# Patient Record
Sex: MALE | Race: WHITE | Employment: OTHER | URBAN - METROPOLITAN AREA
[De-identification: names, ages, dates, MRNs, and addresses within clinical notes are randomized per-mention and may not be internally consistent; named-entity substitution may affect disease eponyms.]

---

## 2018-01-10 ENCOUNTER — APPOINTMENT (OUTPATIENT)
Dept: GENERAL RADIOLOGY | Age: 83
End: 2018-01-10
Payer: MEDICARE

## 2018-01-10 ENCOUNTER — HOSPITAL ENCOUNTER (EMERGENCY)
Age: 83
Discharge: HOME | End: 2018-01-10
Attending: EMERGENCY MEDICINE
Payer: MEDICARE

## 2018-01-10 ENCOUNTER — APPOINTMENT (OUTPATIENT)
Dept: CT IMAGING | Age: 83
End: 2018-01-10
Payer: MEDICARE

## 2018-01-10 VITALS
DIASTOLIC BLOOD PRESSURE: 88 MMHG | TEMPERATURE: 96.3 F | RESPIRATION RATE: 18 BRPM | OXYGEN SATURATION: 98 % | SYSTOLIC BLOOD PRESSURE: 141 MMHG | HEART RATE: 104 BPM | WEIGHT: 130 LBS | BODY MASS INDEX: 20.4 KG/M2 | HEIGHT: 67 IN

## 2018-01-10 DIAGNOSIS — S52.592A OTHER CLOSED FRACTURE OF DISTAL END OF LEFT RADIUS, INITIAL ENCOUNTER: Primary | ICD-10-CM
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
## 2018-01-10 PROCEDURE — 70450 CT HEAD/BRAIN W/O DYE: CPT

## 2018-01-10 PROCEDURE — 29125 APPL SHORT ARM SPLINT STATIC: CPT

## 2018-01-10 PROCEDURE — 73080 X-RAY EXAM OF ELBOW: CPT

## 2018-01-10 PROCEDURE — 99284 EMERGENCY DEPT VISIT MOD MDM: CPT

## 2018-01-10 PROCEDURE — 73110 X-RAY EXAM OF WRIST: CPT

## 2018-01-10 ASSESSMENT — PAIN DESCRIPTION - LOCATION: LOCATION: ARM;SHOULDER

## 2018-01-10 ASSESSMENT — ENCOUNTER SYMPTOMS
NAUSEA: 0
SHORTNESS OF BREATH: 0
VOMITING: 0
TROUBLE SWALLOWING: 0
COUGH: 0

## 2018-01-10 ASSESSMENT — PAIN SCALES - GENERAL: PAINLEVEL_OUTOF10: 7

## 2018-01-10 ASSESSMENT — PAIN DESCRIPTION - ORIENTATION: ORIENTATION: LEFT

## 2018-01-10 NOTE — ED PROVIDER NOTES
[address]  eMERGENCY dEPARTMENT eNCOUnter      Pt Name: Lenny Stewart  MRN: [id number]  [place] 1935  Date of evaluation: 1/10/2018  Provider: Scot Lainez       Chief Complaint   Patient presents with   Arin Silence Fall    Arm Pain    Shoulder Pain         HISTORY OF PRESENT ILLNESS  (Location/Symptom, Timing/Onset, Context/Setting, Quality, Duration, Modifying Factors, Severity.)   Lenny Stewart is a 80 y.o. male who presents to the emergency department for evaluation of  orthopedic pain:    Location/Symptom:  Left arm injury  Timing/Onset:  yesterday  Context/Setting:  Patient presents to ED with complaints of left arm injury. States he tripped and fell yesterday. Patient hit his head but did not lose consciousness. Complaining of left elbow and left wrist pain. States it hurts worse to move it. Patient applied Ace wrap to left wrist but had to remove it because pain was too severe. Denies headache, dizziness, lightheadedness. Denies neck or back pain. Denies taking blood thinners. Right hand dominant. Quality:   Achy, sharp  Duration:   constant  Modifying Factors: Worse with movement, better with rest  Severity:   Mild-moderate      Nursing Notes were reviewed and I agree. REVIEW OF SYSTEMS    (2-9 systems for level 4, 10 or more for level 5)     Review of Systems   Constitutional: Negative for chills and fever. HENT: Negative for trouble swallowing. Respiratory: Negative for cough and shortness of breath. Cardiovascular: Negative for chest pain and palpitations. Gastrointestinal: Negative for nausea and vomiting. Musculoskeletal:        Left wrist pain; left elbow pain     Except as noted above the remainder of the review of systems was reviewed and negative. PAST MEDICAL HISTORY         Diagnosis Date    Arthritis     Asthma     COPD (chronic obstructive pulmonary disease) ([facility] [address]     Pneumonia      Reviewed.   SURGICAL HISTORY exhibits decreased range of motion, tenderness, bony tenderness and swelling. He exhibits no deformity. Cervical back: Normal.        Thoracic back: Normal.        Lumbar back: Normal.   Left wrist with radial and dorsal tenderness. Mild swelling. Radial pulse palpable, +2. Sensation intact, cap refill less than 2 seconds. Neurological: He is alert and oriented to person, place, and time. Coordination normal.   Skin: Skin is warm and dry. He is not diaphoretic. Psychiatric: He has a normal mood and affect. His behavior is normal.       DIAGNOSTIC RESULTS     RADIOLOGY:   [] Visualized by me  And No att. providers found   Xr Elbow Left (min 3 Views)    Result Date: 1/10/2018  EXAMINATION: 3 VIEWS OF THE LEFT ELBOW 1/10/2018 11:55 am COMPARISON: None. HISTORY: ORDERING SYSTEM PROVIDED HISTORY: fall TECHNOLOGIST PROVIDED HISTORY: Reason for exam:->fall Ordering Physician Provided Reason for Exam: Pt fell yesterday pt c/o pain/bruising left wrist/hand/elbow with decreased ROM. Acuity: Acute Type of Exam: Initial Mechanism of Injury: Pt fell yesterday pt c/o pain/bruising left wrist/hand/elbow with decreased ROM. FINDINGS: No evidence of acute fracture or dislocation. Bone mineralization and alignment appear intact. No elevation of the anterior fat pad to suggest significant joint effusion/ hemarthrosis. No radiopaque soft tissue foreign bodies are seen. No evidence of acute fracture. Xr Wrist Left (min 3 Views)    Result Date: 1/10/2018  EXAMINATION: 3 VIEWS OF THE LEFT WRIST 1/10/2018 11:55 am COMPARISON: None. HISTORY: ORDERING SYSTEM PROVIDED HISTORY: fall TECHNOLOGIST PROVIDED HISTORY: Reason for exam:->fall Ordering Physician Provided Reason for Exam: Pt fell yesterday pt c/o pain/bruising left wrist/hand/elbow with decreased ROM. Acuity: Acute Type of Exam: Initial Mechanism of Injury: Pt fell yesterday pt c/o pain/bruising left wrist/hand/elbow with decreased ROM.  FINDINGS: AP, lateral,

## 2018-01-11 ENCOUNTER — OFFICE VISIT (OUTPATIENT)
Dept: ORTHOPEDIC SURGERY | Age: 83
End: 2018-01-11
Payer: MEDICARE

## 2018-01-11 VITALS — BODY MASS INDEX: 20.4 KG/M2 | HEIGHT: 67 IN | WEIGHT: 130 LBS

## 2018-01-11 DIAGNOSIS — S52.532A CLOSED COLLES' FRACTURE OF LEFT RADIUS, INITIAL ENCOUNTER: Primary | ICD-10-CM

## 2018-01-11 PROCEDURE — 25600 CLTX DST RDL FX/EPHYS SEP WO: CPT | Performed by: ORTHOPAEDIC SURGERY

## 2018-01-11 PROCEDURE — 99024 POSTOP FOLLOW-UP VISIT: CPT | Performed by: ORTHOPAEDIC SURGERY

## 2018-01-25 ENCOUNTER — OFFICE VISIT (OUTPATIENT)
Dept: ORTHOPEDIC SURGERY | Age: 83
End: 2018-01-25

## 2018-01-25 DIAGNOSIS — S52.532D CLOSED COLLES' FRACTURE OF LEFT RADIUS WITH ROUTINE HEALING, SUBSEQUENT ENCOUNTER: Primary | ICD-10-CM

## 2018-01-25 PROBLEM — S52.532A FRACTURE, COLLES, LEFT, CLOSED: Status: ACTIVE | Noted: 2018-01-25

## 2018-01-25 PROCEDURE — 99024 POSTOP FOLLOW-UP VISIT: CPT | Performed by: ORTHOPAEDIC SURGERY

## 2018-02-06 ENCOUNTER — OFFICE VISIT (OUTPATIENT)
Dept: ORTHOPEDIC SURGERY | Age: 83
End: 2018-02-06

## 2018-02-06 DIAGNOSIS — S52.532D CLOSED COLLES' FRACTURE OF LEFT RADIUS WITH ROUTINE HEALING, SUBSEQUENT ENCOUNTER: Primary | ICD-10-CM

## 2018-02-06 PROCEDURE — 99024 POSTOP FOLLOW-UP VISIT: CPT | Performed by: ORTHOPAEDIC SURGERY

## 2018-02-20 ENCOUNTER — OFFICE VISIT (OUTPATIENT)
Dept: ORTHOPEDIC SURGERY | Age: 83
End: 2018-02-20

## 2018-02-20 DIAGNOSIS — S52.532D CLOSED COLLES' FRACTURE OF LEFT RADIUS WITH ROUTINE HEALING, SUBSEQUENT ENCOUNTER: Primary | ICD-10-CM

## 2018-02-20 PROCEDURE — 99024 POSTOP FOLLOW-UP VISIT: CPT | Performed by: ORTHOPAEDIC SURGERY

## 2018-02-28 ENCOUNTER — HOSPITAL ENCOUNTER (OUTPATIENT)
Dept: PHYSICAL THERAPY | Age: 83
Setting detail: THERAPIES SERIES
Discharge: HOME | End: 2018-02-28
Payer: MEDICARE

## 2018-02-28 PROCEDURE — G8985 CARRY GOAL STATUS: HCPCS

## 2018-02-28 PROCEDURE — 97162 PT EVAL MOD COMPLEX 30 MIN: CPT

## 2018-02-28 PROCEDURE — G8984 CARRY CURRENT STATUS: HCPCS

## 2018-02-28 PROCEDURE — 97110 THERAPEUTIC EXERCISES: CPT

## 2018-02-28 ASSESSMENT — PAIN DESCRIPTION - ONSET: ONSET: SUDDEN

## 2018-02-28 ASSESSMENT — PAIN DESCRIPTION - DIRECTION: RADIATING_TOWARDS: FINGERS

## 2018-02-28 ASSESSMENT — PAIN SCALES - GENERAL: PAINLEVEL_OUTOF10: 5

## 2018-02-28 ASSESSMENT — PAIN DESCRIPTION - DESCRIPTORS: DESCRIPTORS: ACHING

## 2018-02-28 ASSESSMENT — PAIN DESCRIPTION - PROGRESSION: CLINICAL_PROGRESSION: GRADUALLY IMPROVING

## 2018-02-28 ASSESSMENT — PAIN DESCRIPTION - ORIENTATION: ORIENTATION: LEFT;ANTERIOR;DISTAL

## 2018-02-28 ASSESSMENT — PAIN DESCRIPTION - FREQUENCY: FREQUENCY: CONTINUOUS

## 2018-02-28 ASSESSMENT — PAIN DESCRIPTION - LOCATION: LOCATION: WRIST

## 2018-02-28 ASSESSMENT — PAIN DESCRIPTION - PAIN TYPE: TYPE: CHRONIC PAIN

## 2018-02-28 NOTE — PROGRESS NOTES
Physical Therapy  Initial Assessment  Date: 2018  Patient Name: Blas Bennett  MRN: [id number]  : 1935     Treatment Diagnosis: Painful left wrist    Restrictions  Restrictions/Precautions  Restrictions/Precautions: ROM Restrictions  Required Braces or Orthoses?: No    Subjective   General  Chart Reviewed: Yes  Patient assessed for rehabilitation services?: Yes  Additional Pertinent Hx: COPD   Response To Previous Treatment: Patient with no complaints from previous session. Family / Caregiver Present: No  Referring Practitioner: Kellie  Referral Date : 18  Diagnosis: Closed Colles Fracture left Radis S53.532D  Follows Commands: Within Functional Limits  PT Visit Information  Onset Date: 18  PT Insurance Information: Medicare   Total # of Visits Approved: 12  Total # of Visits to Date: 1  Plan of Care/Certification Expiration Date: 18  No Show: 0  Progress Note Due Date: 18  Canceled Appointment: 0  Progress Note Counter: 1/10 Medicare G code   Subjective  Subjective: I fell walking out of my house. I fell dack onto my wrist, elbow, shoulder and head. Pain Screening  Patient Currently in Pain: Yes  Pain Assessment  Pain Assessment: 0-10  Pain Level: 5  Pain Type: Chronic pain  Pain Location: Wrist  Pain Orientation: Left; Anterior;Distal  Pain Radiating Towards: fingers   Pain Descriptors: Aching  Pain Frequency: Continuous  Pain Onset: Sudden  Clinical Progression: Gradually improving  Effect of Pain on Daily Activities: 100%of his ADL's   Patient's Stated Pain Goal: No pain  Pain Intervention(s): Medication (see eMar); Cold applied;MD notified (comment); Ambulation/Increased activity; Elevation; Emotional support; Environmental changes; Rest;Splinting  Response to Pain Intervention: None  Vital Signs  Patient Currently in Pain: Yes  Patient Observation  Observations: mild swelling     Vision/Hearing  Vision  Vision: Within Functional Limits  Hearing  Hearing: Within functional
Exercise 2: ice, comp, elevation                      Assessment   Neurological  Neurological (WDL): Within Defined Limits  Conditions Requiring Skilled Therapeutic Intervention  Body structures, Functions, Activity limitations: Decreased functional mobility ; Decreased ADL status; Decreased ROM; Decreased strength;Decreased high-level IADLs  Assessment: The patient should do well with physical therapy for his left wrist and hand. Treatment Diagnosis: Painful left wrist  Prognosis: Fair  Decision Making: Medium Complexity  Barriers to Learning: None  REQUIRES PT FOLLOW UP: Yes  Discharge Recommendations: Home independently  Activity Tolerance  Activity Tolerance: Patient limited by pain         Plan    Start land based treatment 3 x week x 4 weeks for left wrist fracture due to a fall on the ice. G-Code  PT G-Codes  Functional Assessment Tool Used: OPTIMAL   Score: OPTIMAL score 15/3  Functional Limitation: Carrying, moving and handling objects  Carrying, Moving and Handling Objects Current Status (): At least 80 percent but less than 100 percent impaired, limited or restricted  Carrying, Moving and Handling Objects Goal Status (): At least 20 percent but less than 40 percent impaired, limited or restricted    OutComes Score  An OPTIMAL score of 3/3 is normal function without pain. Goals  Short term goals  Time Frame for Short term goals: 2 weeks   Short term goal 1: OPTIMAL = 15/3 at the time of his evaluation, his goal is 12/3. Short term goal 2: Worst pain is 10/10 at the time of his evaluation, his goal is 2/10. Short term goal 3: Independant with his home program.   Long term goals  Time Frame for Long term goals : 4 weeks   Long term goal 1: OPTIMAL score at the time of discharge is 6/3. Patient Goals   Patient goals : Use left wrist and arm normally.         Therapy Time   Individual Concurrent Group Co-treatment   Time In  1000         Time Out  1100         Minutes  60 min total

## 2018-03-05 ENCOUNTER — HOSPITAL ENCOUNTER (OUTPATIENT)
Dept: PHYSICAL THERAPY | Age: 83
Setting detail: THERAPIES SERIES
Discharge: HOME | End: 2018-03-05
Payer: MEDICARE

## 2018-03-05 PROCEDURE — 97110 THERAPEUTIC EXERCISES: CPT

## 2018-03-05 PROCEDURE — 97124 MASSAGE THERAPY: CPT

## 2018-03-05 ASSESSMENT — PAIN DESCRIPTION - PAIN TYPE: TYPE: CHRONIC PAIN

## 2018-03-05 ASSESSMENT — PAIN DESCRIPTION - LOCATION: LOCATION: WRIST;SHOULDER;ELBOW

## 2018-03-05 ASSESSMENT — PAIN DESCRIPTION - PROGRESSION: CLINICAL_PROGRESSION: GRADUALLY IMPROVING

## 2018-03-05 ASSESSMENT — PAIN SCALES - GENERAL: PAINLEVEL_OUTOF10: 5

## 2018-03-05 ASSESSMENT — PAIN DESCRIPTION - ORIENTATION: ORIENTATION: LEFT

## 2018-03-05 NOTE — PROGRESS NOTES
control. Given written instructions for active 6 pack finger exercises , with demo. REQUIRES PT FOLLOW UP: Yes  Discharge Recommendations: Continue to assess pending progress      Goals:  Patient Goals:  Patient goals : Use left wrist and arm normally. Short Term Goals:  Time Frame for Short term goals: 2 weeks   Short term goal 1: OPTIMAL = 15/3 at the time of his evaluation, his goal is 12/3.- ONGOING  Short term goal 2: Worst pain is 10/10 at the time of his evaluation, his goal is 2/10. - ONGOING  Short term goal 3: Independent with his home program. - ONGOING  Long Term Goals:  Time Frame for Long term goals : 4 weeks   Long term goal 1: OPTIMAL score at the time of discharge is 6/3. Plan:     Times per week: 3x/wk for 4 weeks  Current Treatment Recommendations: Strengthening;ROM; Home Exercise Program;Modalities;Manual Therapy - Soft Tissue Mobilization;Manual Therapy - Joint Manipulation;Patient/Caregiver Education & Training  Plan Comment: Progress as tolerated, monitor edema    Therapy Time:  Time In: 8429  Time Out: 9355  Minutes: 50  Timed Code Treatment Minutes: 50 Minutes    Treatment Charges: Minutes Units   []  Ultrasound     []  Electrical-Stim     []  Iontophoresis     []  Traction     [x]  Massage  10  1   []  Eval     []  Gait     [x]  Ther Exercise 40  2    []  Manual Therapy       []  Ther Activities       []  Aquatics     []  Neuro Re-Ed       []  Other       Total Treatment Time: 48 3        Mohsen Amos PT Electronically signed by Chava Araiza PT,CHT on 3/5/2018 at 4:21 PM

## 2018-03-06 ENCOUNTER — OFFICE VISIT (OUTPATIENT)
Dept: ORTHOPEDIC SURGERY | Age: 83
End: 2018-03-06

## 2018-03-06 DIAGNOSIS — S52.532D CLOSED COLLES' FRACTURE OF LEFT RADIUS WITH ROUTINE HEALING, SUBSEQUENT ENCOUNTER: Primary | ICD-10-CM

## 2018-03-06 PROCEDURE — 99024 POSTOP FOLLOW-UP VISIT: CPT | Performed by: ORTHOPAEDIC SURGERY

## 2018-03-06 NOTE — PROGRESS NOTES
Subjective:      Patient ID: Yvan Osullivan is a 80 y.o. male. HPI  Patient is here for follow-up Colles' fracture. Patient's thumb range of motion is improving    Fingers still remained quite stiff    Patient just started physical therapy  Review of Systems    Objective:   Physical Exam  Patient unable to get closer than 2 cm to palm with his fingertips    Some stiffness in his left shoulder as well  Assessment:      Encounter Diagnosis   Name Primary?     Closed Colles' fracture of left radius with routine healing, subsequent encounter Yes           Plan:      Work with range of motion on hand wrist and elbow and shoulder  Follow-up 4 weeks

## 2018-03-07 ENCOUNTER — HOSPITAL ENCOUNTER (OUTPATIENT)
Dept: PHYSICAL THERAPY | Age: 83
Setting detail: THERAPIES SERIES
Discharge: HOME | End: 2018-03-07
Payer: MEDICARE

## 2018-03-07 PROCEDURE — 97124 MASSAGE THERAPY: CPT

## 2018-03-07 PROCEDURE — 97110 THERAPEUTIC EXERCISES: CPT

## 2018-03-07 ASSESSMENT — PAIN DESCRIPTION - PAIN TYPE: TYPE: CHRONIC PAIN

## 2018-03-07 ASSESSMENT — PAIN DESCRIPTION - PROGRESSION: CLINICAL_PROGRESSION: GRADUALLY IMPROVING

## 2018-03-07 ASSESSMENT — PAIN SCALES - GENERAL: PAINLEVEL_OUTOF10: 3

## 2018-03-07 ASSESSMENT — PAIN DESCRIPTION - LOCATION: LOCATION: SHOULDER;WRIST;ELBOW

## 2018-03-07 ASSESSMENT — PAIN DESCRIPTION - ORIENTATION: ORIENTATION: LEFT

## 2018-03-09 ENCOUNTER — HOSPITAL ENCOUNTER (OUTPATIENT)
Dept: PHYSICAL THERAPY | Age: 83
Setting detail: THERAPIES SERIES
Discharge: HOME | End: 2018-03-09
Payer: MEDICARE

## 2018-03-09 PROCEDURE — 97110 THERAPEUTIC EXERCISES: CPT

## 2018-03-09 PROCEDURE — 97124 MASSAGE THERAPY: CPT

## 2018-03-09 ASSESSMENT — PAIN DESCRIPTION - ORIENTATION: ORIENTATION: LEFT

## 2018-03-09 ASSESSMENT — PAIN DESCRIPTION - PAIN TYPE: TYPE: CHRONIC PAIN

## 2018-03-09 ASSESSMENT — PAIN DESCRIPTION - PROGRESSION: CLINICAL_PROGRESSION: NOT CHANGED

## 2018-03-09 ASSESSMENT — PAIN SCALES - GENERAL: PAINLEVEL_OUTOF10: 4

## 2018-03-09 ASSESSMENT — PAIN DESCRIPTION - LOCATION: LOCATION: SHOULDER;WRIST;ELBOW

## 2018-03-09 NOTE — PROGRESS NOTES
of trial of Isotoner glove for compression  REQUIRES PT FOLLOW UP: Yes  Discharge Recommendations: Continue to assess pending progress      Goals:  Patient Goals:  Patient goals : Use left wrist and arm normally. Short Term Goals:  Time Frame for Short term goals: 2 weeks   Short term goal 1: OPTIMAL = 15/3 at the time of his evaluation, his goal is 12/3.- ONGOING  Short term goal 2: Worst pain is 10/10 at the time of his evaluation, his goal is 2/10. - PARTLY MET ( NOW WORST 5-6/10 )  Short term goal 3: Independent with his home program. - MET  Long Term Goals:  Time Frame for Long term goals : 4 weeks   Long term goal 1: OPTIMAL score at the time of discharge is 6/3. Plan:     Times per week: 3x/wk for 4 weeks  Current Treatment Recommendations: Strengthening;ROM;Modalities;Manual Therapy - Soft Tissue Mobilization;Manual Therapy - Joint Manipulation;Patient/Caregiver Education & Training  Plan Comment: Requests to decrease to 2x/wk, progress with gentle strengthening exercises and continue with retorgrade massage for edema control.     Therapy Time:  Time In: 2645  Time Out: 1340  Minutes: 55  Timed Code Treatment Minutes: 50 Minutes    Treatment Charges: Minutes Units   []  Ultrasound     []  Electrical-Stim     []  Iontophoresis     []  Traction     [x]  Massage 10  1    []  Eval     []  Gait     [x]  Ther Exercise 40   2   []  Manual Therapy       []  Ther Activities       []  Aquatics     []  Neuro Re-Ed       []  Other       Total Treatment Time: 48 3        Ma  Luis Enrique Penny PT Electronically signed by Nathaniel Balderas PT,CHT on 3/9/2018 at 1:48 PM

## 2018-03-12 ENCOUNTER — HOSPITAL ENCOUNTER (OUTPATIENT)
Dept: PHYSICAL THERAPY | Age: 83
Setting detail: THERAPIES SERIES
Discharge: HOME | End: 2018-03-12
Payer: MEDICARE

## 2018-03-12 NOTE — PROGRESS NOTES
[address]   Outpatient Physical Therapy  Cancel/No Show Note    Date: 3/12/18    Patient Name: Danika Oliva        MRN: [id number]  Account: [de-identified] : 1935      General Information:  Referring Practitioner: Dr. Mckinley Box  Referral Date : 18  Diagnosis: Closed Colles Fracture left Radius S53.532D  Other (Comment): 's appt.  4/3/18  Onset Date: 18  PT Insurance Information: Medicare   Total # of Visits Approved: 12  Total # of Visits to Date: 4  No Show: 0  Canceled Appointment: 0    Subjective: Cancelled appointment per         [address] [place], PT Electronically signed by [address], PT,T on 3/12/2018 at 11:13 AM

## 2018-03-15 ENCOUNTER — HOSPITAL ENCOUNTER (OUTPATIENT)
Dept: PHYSICAL THERAPY | Age: 83
Setting detail: THERAPIES SERIES
Discharge: HOME | End: 2018-03-15
Payer: MEDICARE

## 2018-03-15 PROCEDURE — 97110 THERAPEUTIC EXERCISES: CPT

## 2018-03-15 PROCEDURE — 97124 MASSAGE THERAPY: CPT

## 2018-03-15 ASSESSMENT — PAIN DESCRIPTION - PROGRESSION: CLINICAL_PROGRESSION: GRADUALLY IMPROVING

## 2018-03-15 ASSESSMENT — PAIN SCALES - GENERAL: PAINLEVEL_OUTOF10: 3

## 2018-03-15 ASSESSMENT — PAIN DESCRIPTION - ORIENTATION: ORIENTATION: LEFT

## 2018-03-15 ASSESSMENT — PAIN DESCRIPTION - LOCATION: LOCATION: WRIST

## 2018-03-15 ASSESSMENT — PAIN DESCRIPTION - PAIN TYPE: TYPE: CHRONIC PAIN

## 2018-03-19 ENCOUNTER — HOSPITAL ENCOUNTER (OUTPATIENT)
Dept: PHYSICAL THERAPY | Age: 83
Setting detail: THERAPIES SERIES
Discharge: HOME | End: 2018-03-19
Payer: MEDICARE

## 2018-03-19 PROCEDURE — 97140 MANUAL THERAPY 1/> REGIONS: CPT

## 2018-03-19 PROCEDURE — 97110 THERAPEUTIC EXERCISES: CPT

## 2018-03-19 PROCEDURE — G8984 CARRY CURRENT STATUS: HCPCS

## 2018-03-19 PROCEDURE — G8985 CARRY GOAL STATUS: HCPCS

## 2018-03-19 ASSESSMENT — 9 HOLE PEG TEST
TESTTIME_SECONDS: 30
TESTTIME_SECONDS: 23
TEST_RESULT: IMPAIRED

## 2018-03-19 ASSESSMENT — PAIN DESCRIPTION - LOCATION: LOCATION: WRIST

## 2018-03-19 ASSESSMENT — PAIN DESCRIPTION - ORIENTATION: ORIENTATION: LEFT

## 2018-03-19 ASSESSMENT — PAIN SCALES - GENERAL: PAINLEVEL_OUTOF10: 3

## 2018-03-19 ASSESSMENT — PAIN DESCRIPTION - PROGRESSION: CLINICAL_PROGRESSION: GRADUALLY IMPROVING

## 2018-03-19 ASSESSMENT — PAIN DESCRIPTION - PAIN TYPE: TYPE: CHRONIC PAIN

## 2018-03-19 NOTE — PROGRESS NOTES
Physical Therapy  Progress Note  Date: 3/19/2018  Patient Name: Mat Ferrell  MRN: [id number]     :   1935    Subjective:   General  Referring Practitioner: Dr. Siobhan Godinez  PT Visit Information  Onset Date: 18  PT Insurance Information: Medicare   Total # of Visits Approved: 12  Total # of Visits to Date: 6  No Show: 0  Canceled Appointment: 1  Progress Note Counter: 6/10 Medicare G code   Subjective  Subjective: Reports used left hand a lot over the weekend, washed dishes, did laundry. Tried to lift an iron skillet with left hand but with increased pain. Able to get compression glove and states it seemed to help with swelling. Pain Screening  Patient Currently in Pain: Yes  Pain Assessment  Pain Assessment: 0-10  Pain Level: 3  Pain Type: Chronic pain  Pain Location: Wrist  Pain Orientation: Left  Clinical Progression: Gradually improving  Vital Signs  Patient Currently in Pain: Yes       Treatment Activities:      AROM LUE (degrees)  LUE AROM : Exceptions  L Shoulder Flexion 0-180: 0 / 90 (  with pain )  L Shoulder ABduction 0-180: 0 / 80 with pain   L Shoulder Int Rotation  0-70: 0 / 60 with pain  L Shoulder Ext Rotation  0-90: 0 / 45 with pain  L Elbow Flexion 0-145: WFL with pain  L Elbow Extension 145-0: lacks 5 deg to full extension  L Forearm Supination  0-90: 0 / 10 ( NOW 65 )  L Wrist Flexion 0-80: 0 / 20 ( NOW 45 )  L Wrist Extension 0-70: 0 ( NOW 50 )  L Wrist Radial Deviation 0-20: 0 / 5 ( NOW 15 )  L Wrist Ulnar Deviation 0-45: 0 / 10 ( NOW 25 )  Left Hand AROM (degrees)  Left Hand AROM: Exceptions  Left Hand General AROM: Composite flexion:  Distance from fingertip to palm : Index: 3 cm  Middle: 3.5 cm  Rin cm Little 4.5 cm ( old injury )  L Thumb Radial ADduction/ABduction 0-55: 0 / 35  L Thumb Opposition: Now able to oppose to each digit except little finger due to old injury   Exercises  Exercise 4: AA/PROM , elbow, wrist/ fingers/ thumb; tendon gliding ex.   Exercise 5: carrying ( 4@ - much difficulty ) = 80% limited  Functional Limitation: Carrying, moving and handling objects  Carrying, Moving and Handling Objects Current Status (): At least 80 percent but less than 100 percent impaired, limited or restricted  Carrying, Moving and Handling Objects Goal Status (): At least 20 percent but less than 40 percent impaired, limited or restricted  Goals:  Short term goals  Time Frame for Short term goals: 2 weeks   Short term goal 1: OPTIMAL = 15/3 at the time of his evaluation, his goal is 12/3.- MET ( NOW 12/3 )  Short term goal 2: Worst pain is 10/10 at the time of his evaluation, his goal is 2/10. - PARTLY MET ( NOW WORST 5-6/10, left wrist , pain more on left shoulder )  Short term goal 3: Independent with his home program. - MET  Long term goals  Time Frame for Long term goals : 4 weeks   Long term goal 1: OPTIMAL score at the time of discharge is 6/3.  - ONGOING  Long term goal 2: NEW GOAL: Improve L  strength by 10# from 5# to be able to grasp with less difficulty  Long term goal 3: Improve L prehension by 2# @  Long term goal 4: Be able to make a full composite flexion  Long term goal 5: Improve coordination of L hand by 3-5 sec  Patient Goals   Patient goals : Use left wrist and arm normally.    Plan:    Plan  Times per week: 3x/wk for 4 weeks  Current Treatment Recommendations: Strengthening, ROM, Modalities, Home Exercise Program, Manual Therapy - Soft Tissue Mobilization, Manual Therapy - Joint Manipulation, Patient/Caregiver Education & Training  Plan Comment: Requests to decrease to 2x/wk, progress with gentle strengthening exercises , continue with passive stretches/ joint mobs as tolerated  Timed Code Treatment Minutes: 50 Minutes     Therapy Time   Individual Concurrent Group Co-treatment   Time In 2445         Time Out 1340         Minutes 55         Timed Code Treatment Minutes: 50 Minutes     Treatment Charges: Minutes Units   []  Ultrasound     [] Electrical-Stim     []  Iontophoresis     []  Traction     []  Massage       []  Eval     []  Gait     [x]  Ther Exercise 35  2    [x]  Manual Therapy 15  1    []  Ther Activities       []  Aquatics     []  Vasopneumatic Device     []  Neuro Re-Ed       []  Other       Total Treatment Time: 48 3        Ma  Colletta Hermanns, PT Electronically signed by Romualdo Lesch, PT,CHT on 3/19/2018 at 2:22 PM

## 2018-03-22 ENCOUNTER — HOSPITAL ENCOUNTER (OUTPATIENT)
Dept: PHYSICAL THERAPY | Age: 83
Setting detail: THERAPIES SERIES
Discharge: HOME | End: 2018-03-22
Payer: MEDICARE

## 2018-03-22 PROCEDURE — 97110 THERAPEUTIC EXERCISES: CPT

## 2018-03-22 PROCEDURE — 97140 MANUAL THERAPY 1/> REGIONS: CPT

## 2018-03-22 ASSESSMENT — PAIN DESCRIPTION - DESCRIPTORS: DESCRIPTORS: TIGHTNESS

## 2018-03-22 ASSESSMENT — PAIN DESCRIPTION - PROGRESSION: CLINICAL_PROGRESSION: GRADUALLY IMPROVING

## 2018-03-22 ASSESSMENT — PAIN DESCRIPTION - PAIN TYPE: TYPE: CHRONIC PAIN

## 2018-03-22 ASSESSMENT — PAIN SCALES - GENERAL: PAINLEVEL_OUTOF10: 3

## 2018-03-22 ASSESSMENT — PAIN DESCRIPTION - LOCATION: LOCATION: WRIST;HAND

## 2018-03-22 ASSESSMENT — PAIN DESCRIPTION - ORIENTATION: ORIENTATION: LEFT

## 2018-03-22 NOTE — PROGRESS NOTES
[address]   Outpatient Physical Therapy  [address] [facility]  Phone: [phone]  Fax: [phone]    Daily Progress Note    Date: 3/22/18    Patient Name: Yvan Osullivan        MRN: [id number]  Account: [de-identified] : 1935      General Information:  Referring Practitioner: Dr. Liza Griffiths  Referral Date : 18  Diagnosis: Closed Colles Fracture left Radius S53.532D  Follows Commands: Within Functional Limits  Other (Comment): 's appt. 4/3/18  Onset Date: 18  PT Insurance Information: Medicare   Total # of Visits Approved: 12  Total # of Visits to Date: 7  No Show: 0  Canceled Appointment: 1    Subjective:  Subjective: \" Worked my hand hard this morning, it's stiff \"     Pain:  Patient Currently in Pain: Yes  Pain Assessment: 0-10  Pain Level: 3  Pain Type: Chronic pain  Pain Location: Wrist;Hand  Pain Orientation: Left  Pain Descriptors: Tightness  Clinical Progression: Gradually improving       Objective:  Exercise 3: Retrograde massage   Exercise 4: AA/PROM , elbow, wrist/ fingers/ thumb; tendon gliding ex.; jt. mob L wrist - gentle distraction  Exercise 5: Putty with cone , 4 rows  Exercise 6: Key pegs  Exercise 7: Baps ball L 1-5, - 3x CW/CCW  Exercise 8: EZ-EXERBOARD- #7 - key - 5x  Exercise 9: Juxacizer 5x  Exercise 11: Velcro checkers 8 rows  Exercise 12: Power web, green 10x flex  Exercise 13: Flexbar, yellow, 15x@       Assessment: Body structures, Functions, Activity limitations: Decreased functional mobility ; Decreased ADL status; Decreased ROM; Decreased strength;Decreased high-level IADLs;Decreased coordination  Assessment: Other problem: Pain. Now able to note MCP joints due to decreased edema on dorsal hand. Compliant with HEP and use of edema glove.   Treatment Diagnosis: Painful left wrist  Prognosis: Good  REQUIRES PT FOLLOW UP: Yes  Discharge Recommendations: Continue to assess pending progress   Goals:  Patient Goals:  Patient goals : Use left wrist

## 2018-03-26 ENCOUNTER — HOSPITAL ENCOUNTER (OUTPATIENT)
Dept: PHYSICAL THERAPY | Age: 83
Setting detail: THERAPIES SERIES
Discharge: HOME | End: 2018-03-26
Payer: MEDICARE

## 2018-03-26 PROCEDURE — 97110 THERAPEUTIC EXERCISES: CPT

## 2018-03-26 PROCEDURE — 97140 MANUAL THERAPY 1/> REGIONS: CPT

## 2018-03-26 ASSESSMENT — PAIN SCALES - GENERAL: PAINLEVEL_OUTOF10: 3

## 2018-03-26 ASSESSMENT — PAIN DESCRIPTION - PAIN TYPE: TYPE: CHRONIC PAIN

## 2018-03-26 ASSESSMENT — PAIN DESCRIPTION - ORIENTATION: ORIENTATION: LEFT

## 2018-03-26 ASSESSMENT — PAIN DESCRIPTION - LOCATION: LOCATION: HAND;WRIST

## 2018-03-26 ASSESSMENT — PAIN DESCRIPTION - PROGRESSION: CLINICAL_PROGRESSION: GRADUALLY IMPROVING

## 2018-03-29 ENCOUNTER — HOSPITAL ENCOUNTER (OUTPATIENT)
Dept: PHYSICAL THERAPY | Age: 83
Setting detail: THERAPIES SERIES
Discharge: HOME | End: 2018-03-29
Payer: MEDICARE

## 2018-03-29 PROCEDURE — 97140 MANUAL THERAPY 1/> REGIONS: CPT

## 2018-03-29 PROCEDURE — 97110 THERAPEUTIC EXERCISES: CPT

## 2018-03-29 ASSESSMENT — PAIN DESCRIPTION - ORIENTATION: ORIENTATION: LEFT

## 2018-03-29 ASSESSMENT — PAIN SCALES - GENERAL: PAINLEVEL_OUTOF10: 3

## 2018-03-29 ASSESSMENT — PAIN DESCRIPTION - DESCRIPTORS: DESCRIPTORS: TIGHTNESS

## 2018-03-29 ASSESSMENT — PAIN DESCRIPTION - LOCATION: LOCATION: WRIST;HAND

## 2018-03-29 ASSESSMENT — PAIN DESCRIPTION - PAIN TYPE: TYPE: CHRONIC PAIN

## 2018-03-29 ASSESSMENT — PAIN DESCRIPTION - PROGRESSION: CLINICAL_PROGRESSION: GRADUALLY IMPROVING

## 2018-03-29 NOTE — PROGRESS NOTES
[address]   Outpatient Physical Therapy  [address] [facility]. [address]  Phone: [phone]  Fax: [phone]    Daily Progress Note    Date: 3/29/18    Patient Name: Yvan Osullivan        MRN: [id number]  Account: [de-identified] : 1935      General Information:  Referring Practitioner: Dr. Liza Griffiths  Referral Date : 18  Diagnosis: Closed Colles Fracture left Radius S53.532D  Follows Commands: Within Functional Limits  Other (Comment): 's appt. 4/3/18  Onset Date: 18  PT Insurance Information: Medicare   Total # of Visits Approved: 12  Total # of Visits to Date: 9  No Show: 0  Canceled Appointment: 1  Progress Note Counter:  Medicare G code 4/10, done 3/19/18    Subjective:  Subjective: Wrist and fingers stiff today, shoulder hurts more than the hand. Has been using left hand more like folding laundry, washing dishes except for the heavy skillet     Pain:  Patient Currently in Pain: Yes  Pain Assessment: 0-10  Pain Level: 3  Pain Type: Chronic pain  Pain Location: Wrist;Hand  Pain Orientation: Left  Pain Descriptors: Tightness  Clinical Progression: Gradually improving       Objective:  Exercise 2: Wrist PRE's 1#, 10x2  Exercise 3: Retrograde massage   Exercise 4: AA/PROM , elbow, wrist/ fingers/ thumb; tendon gliding ex.; jt. mob L wrist - gentle distraction  Exercise 5: Putty with cone , 4 rows  Exercise 6: Key pegs  Exercise 7: , T = 6inlb, 30\"x2, , T = 4inlb, 30\"x2, , T = 8inlb, 30\"x2  Exercise 9: Juxacizer 5x  Exercise 11: Velcro checkers 8 rows  Exercise 12: Power web, green 10x flex  Exercise 13: Flexbar, yellow, 15x@       Assessment: Body structures, Functions, Activity limitations: Decreased functional mobility ; Decreased ADL status; Decreased ROM; Decreased strength;Decreased coordination;Decreased high-level IADLs  Assessment: Other problem: Pain. Noted increased digital swelling today with reddish discoloration with dependent position.  Able to complete exercise program without increased symptoms. Treatment Diagnosis: Painful left wrist  Prognosis: Good  REQUIRES PT FOLLOW UP: Yes  Discharge Recommendations: Continue to assess pending progress   Goals:  Patient Goals:  Patient goals : Use left wrist and arm normally. Short Term Goals:  Time Frame for Short term goals: 2 weeks   Short term goal 1: OPTIMAL = 15/3 at the time of his evaluation, his goal is 12/3.- MET ( NOW 12/3 )  Short term goal 2: Worst pain is 10/10 at the time of his evaluation, his goal is 2/10. - PARTLY MET ( NOW WORST 5-6/10, left wrist , pain more on left shoulder )  Short term goal 3: Independent with his home program. - MET  Long Term Goals:  Time Frame for Long term goals : 4 weeks   Long term goal 1: OPTIMAL score at the time of discharge is 6/3.  - ONGOING  Long term goal 2: NEW GOAL: Improve L  strength by 10# from 5# to be able to grasp with less difficulty - ONGOING  Long term goal 3: Improve L prehension by 2# @- ONGOING  Long term goal 4: Be able to make a full composite flexion - PARTLY MET  Long term goal 5: Improve coordination of L hand by 3-5 sec - ONGOING    Plan:     Times per week: 3x/wk for 4 weeks  Current Treatment Recommendations: Strengthening;ROM; Home Exercise Program;Manual Therapy - Soft Tissue Mobilization;Manual Therapy - Joint Manipulation;Patient/Caregiver Education & Training  Plan Comment: Requests to decrease to 2x/wk, progress with gentle strengthening exercises , continue with passive stretches/ joint mobs as tolerated. Recheck next week prior to 's appt.     Therapy Time:  Time In: 1030  Time Out: 1115  Minutes: 45  Timed Code Treatment Minutes: 45 Minutes    Treatment Charges: Minutes Units   []  Ultrasound     []  Electrical-Stim     []  Iontophoresis     []  Traction     []  Massage       []  Eval     []  Gait     [x]  Ther Exercise 30   2   [x]  Manual Therapy 15  1    []  Ther Activities       []  Aquatics     []  Neuro Re-Ed       [] Other       Total Treatment Time: 39 3        Ma  Kaity Mueller PT Electronically signed by Jax Curran PT,CHT on 3/29/2018 at 1:05 PM

## 2018-04-03 ENCOUNTER — OFFICE VISIT (OUTPATIENT)
Dept: ORTHOPEDIC SURGERY | Age: 83
End: 2018-04-03

## 2018-04-03 ENCOUNTER — HOSPITAL ENCOUNTER (OUTPATIENT)
Dept: PHYSICAL THERAPY | Age: 83
Setting detail: THERAPIES SERIES
Discharge: HOME | End: 2018-04-03
Payer: MEDICARE

## 2018-04-03 DIAGNOSIS — G89.29 CHRONIC LEFT SHOULDER PAIN: ICD-10-CM

## 2018-04-03 DIAGNOSIS — M25.512 CHRONIC LEFT SHOULDER PAIN: ICD-10-CM

## 2018-04-03 DIAGNOSIS — S52.532D CLOSED COLLES' FRACTURE OF LEFT RADIUS WITH ROUTINE HEALING, SUBSEQUENT ENCOUNTER: Primary | ICD-10-CM

## 2018-04-03 PROCEDURE — 99024 POSTOP FOLLOW-UP VISIT: CPT | Performed by: ORTHOPAEDIC SURGERY

## 2018-04-03 PROCEDURE — 97140 MANUAL THERAPY 1/> REGIONS: CPT

## 2018-04-03 PROCEDURE — 97110 THERAPEUTIC EXERCISES: CPT

## 2018-04-03 ASSESSMENT — 9 HOLE PEG TEST
TESTTIME_SECONDS: 26
TESTTIME_SECONDS: 23

## 2018-04-03 ASSESSMENT — PAIN DESCRIPTION - PAIN TYPE: TYPE: CHRONIC PAIN

## 2018-04-03 ASSESSMENT — PAIN SCALES - GENERAL: PAINLEVEL_OUTOF10: 3

## 2018-04-03 ASSESSMENT — PAIN DESCRIPTION - ORIENTATION: ORIENTATION: LEFT

## 2018-04-03 ASSESSMENT — PAIN DESCRIPTION - PROGRESSION: CLINICAL_PROGRESSION: GRADUALLY IMPROVING

## 2018-04-03 ASSESSMENT — PAIN DESCRIPTION - LOCATION: LOCATION: WRIST;HAND

## 2018-04-05 ENCOUNTER — HOSPITAL ENCOUNTER (OUTPATIENT)
Dept: PHYSICAL THERAPY | Age: 83
Setting detail: THERAPIES SERIES
Discharge: HOME | End: 2018-04-05
Payer: MEDICARE

## 2018-04-05 PROCEDURE — G8985 CARRY GOAL STATUS: HCPCS

## 2018-04-05 PROCEDURE — 97164 PT RE-EVAL EST PLAN CARE: CPT

## 2018-04-05 PROCEDURE — G8984 CARRY CURRENT STATUS: HCPCS

## 2018-04-05 PROCEDURE — 97110 THERAPEUTIC EXERCISES: CPT

## 2018-04-05 PROCEDURE — 97140 MANUAL THERAPY 1/> REGIONS: CPT

## 2018-04-05 ASSESSMENT — PAIN DESCRIPTION - ORIENTATION
ORIENTATION: LEFT
ORIENTATION_2: LEFT

## 2018-04-05 ASSESSMENT — PAIN DESCRIPTION - LOCATION
LOCATION_2: SHOULDER
LOCATION: HAND;WRIST

## 2018-04-05 ASSESSMENT — PAIN DESCRIPTION - DURATION: DURATION_2: INTERMITTENT

## 2018-04-05 ASSESSMENT — PAIN DESCRIPTION - PROGRESSION
CLINICAL_PROGRESSION_2: GRADUALLY IMPROVING
CLINICAL_PROGRESSION: GRADUALLY IMPROVING

## 2018-04-05 ASSESSMENT — PAIN DESCRIPTION - PAIN TYPE
TYPE_2: CHRONIC PAIN
TYPE: CHRONIC PAIN

## 2018-04-05 ASSESSMENT — PAIN SCALES - GENERAL: PAINLEVEL_OUTOF10: 3

## 2018-04-05 ASSESSMENT — PAIN DESCRIPTION - INTENSITY: RATING_2: 6

## 2018-04-05 ASSESSMENT — PAIN DESCRIPTION - DESCRIPTORS: DESCRIPTORS_2: ACHING;SHARP

## 2018-04-09 ENCOUNTER — HOSPITAL ENCOUNTER (OUTPATIENT)
Dept: PHYSICAL THERAPY | Age: 83
Setting detail: THERAPIES SERIES
Discharge: HOME | End: 2018-04-09
Payer: MEDICARE

## 2018-04-09 PROCEDURE — 97140 MANUAL THERAPY 1/> REGIONS: CPT

## 2018-04-09 PROCEDURE — 97110 THERAPEUTIC EXERCISES: CPT

## 2018-04-09 ASSESSMENT — PAIN DESCRIPTION - ORIENTATION
ORIENTATION: LEFT
ORIENTATION_2: LEFT

## 2018-04-09 ASSESSMENT — PAIN DESCRIPTION - PAIN TYPE
TYPE: CHRONIC PAIN
TYPE_2: CHRONIC PAIN

## 2018-04-09 ASSESSMENT — PAIN DESCRIPTION - PROGRESSION
CLINICAL_PROGRESSION: GRADUALLY IMPROVING
CLINICAL_PROGRESSION_2: NOT CHANGED

## 2018-04-09 ASSESSMENT — PAIN DESCRIPTION - LOCATION
LOCATION_2: SHOULDER
LOCATION: HAND;WRIST

## 2018-04-09 ASSESSMENT — PAIN DESCRIPTION - INTENSITY: RATING_2: 6

## 2018-04-09 ASSESSMENT — PAIN SCALES - GENERAL: PAINLEVEL_OUTOF10: 2

## 2018-04-11 ENCOUNTER — HOSPITAL ENCOUNTER (OUTPATIENT)
Dept: PHYSICAL THERAPY | Age: 83
Setting detail: THERAPIES SERIES
Discharge: HOME | End: 2018-04-11
Payer: MEDICARE

## 2018-04-11 PROCEDURE — 97140 MANUAL THERAPY 1/> REGIONS: CPT

## 2018-04-11 PROCEDURE — 97110 THERAPEUTIC EXERCISES: CPT

## 2018-04-11 ASSESSMENT — PAIN DESCRIPTION - PAIN TYPE: TYPE: CHRONIC PAIN

## 2018-04-11 ASSESSMENT — PAIN SCALES - GENERAL: PAINLEVEL_OUTOF10: 2

## 2018-04-11 ASSESSMENT — PAIN DESCRIPTION - LOCATION
LOCATION: WRIST;HAND
LOCATION_2: SHOULDER

## 2018-04-11 ASSESSMENT — PAIN DESCRIPTION - ORIENTATION
ORIENTATION_2: LEFT
ORIENTATION: LEFT

## 2018-04-11 ASSESSMENT — PAIN DESCRIPTION - PROGRESSION
CLINICAL_PROGRESSION: GRADUALLY IMPROVING
CLINICAL_PROGRESSION_2: GRADUALLY IMPROVING

## 2018-04-11 ASSESSMENT — PAIN DESCRIPTION - INTENSITY: RATING_2: 4

## 2018-04-16 ENCOUNTER — HOSPITAL ENCOUNTER (OUTPATIENT)
Dept: PHYSICAL THERAPY | Age: 83
Setting detail: THERAPIES SERIES
Discharge: HOME | End: 2018-04-16
Payer: MEDICARE

## 2018-04-18 ENCOUNTER — APPOINTMENT (OUTPATIENT)
Dept: PHYSICAL THERAPY | Age: 83
End: 2018-04-18
Payer: MEDICARE

## 2018-04-23 ENCOUNTER — APPOINTMENT (OUTPATIENT)
Dept: PHYSICAL THERAPY | Age: 83
End: 2018-04-23
Payer: MEDICARE

## 2018-04-25 ENCOUNTER — APPOINTMENT (OUTPATIENT)
Dept: PHYSICAL THERAPY | Age: 83
End: 2018-04-25
Payer: MEDICARE

## 2018-05-01 ENCOUNTER — OFFICE VISIT (OUTPATIENT)
Dept: ORTHOPEDIC SURGERY | Age: 83
End: 2018-05-01
Payer: MEDICARE

## 2018-05-01 DIAGNOSIS — M75.02 ADHESIVE CAPSULITIS OF LEFT SHOULDER: ICD-10-CM

## 2018-05-01 DIAGNOSIS — S52.532D CLOSED COLLES' FRACTURE OF LEFT RADIUS WITH ROUTINE HEALING, SUBSEQUENT ENCOUNTER: Primary | ICD-10-CM

## 2018-05-01 PROCEDURE — 4040F PNEUMOC VAC/ADMIN/RCVD: CPT | Performed by: ORTHOPAEDIC SURGERY

## 2018-05-01 PROCEDURE — G8427 DOCREV CUR MEDS BY ELIG CLIN: HCPCS | Performed by: ORTHOPAEDIC SURGERY

## 2018-05-01 PROCEDURE — 99213 OFFICE O/P EST LOW 20 MIN: CPT | Performed by: ORTHOPAEDIC SURGERY

## 2018-05-01 PROCEDURE — 20610 DRAIN/INJ JOINT/BURSA W/O US: CPT | Performed by: ORTHOPAEDIC SURGERY

## 2018-05-01 PROCEDURE — 1123F ACP DISCUSS/DSCN MKR DOCD: CPT | Performed by: ORTHOPAEDIC SURGERY

## 2018-05-01 PROCEDURE — 1036F TOBACCO NON-USER: CPT | Performed by: ORTHOPAEDIC SURGERY

## 2018-05-01 PROCEDURE — G8420 CALC BMI NORM PARAMETERS: HCPCS | Performed by: ORTHOPAEDIC SURGERY

## 2018-05-01 RX ORDER — LIDOCAINE HYDROCHLORIDE 10 MG/ML
2 INJECTION, SOLUTION EPIDURAL; INFILTRATION; INTRACAUDAL; PERINEURAL ONCE
Status: COMPLETED | OUTPATIENT
Start: 2018-05-01 | End: 2018-05-01

## 2018-05-01 RX ORDER — BUPIVACAINE HYDROCHLORIDE 5 MG/ML
2 INJECTION, SOLUTION PERINEURAL ONCE
Status: COMPLETED | OUTPATIENT
Start: 2018-05-01 | End: 2018-05-01

## 2018-05-01 RX ORDER — BETAMETHASONE SODIUM PHOSPHATE AND BETAMETHASONE ACETATE 3; 3 MG/ML; MG/ML
12 INJECTION, SUSPENSION INTRA-ARTICULAR; INTRALESIONAL; INTRAMUSCULAR; SOFT TISSUE ONCE
Status: COMPLETED | OUTPATIENT
Start: 2018-05-01 | End: 2018-05-01

## 2018-05-01 RX ADMIN — BUPIVACAINE HYDROCHLORIDE 10 MG: 5 INJECTION, SOLUTION PERINEURAL at 13:19

## 2018-05-01 RX ADMIN — LIDOCAINE HYDROCHLORIDE 2 ML: 10 INJECTION, SOLUTION EPIDURAL; INFILTRATION; INTRACAUDAL; PERINEURAL at 13:20

## 2018-05-01 RX ADMIN — BETAMETHASONE SODIUM PHOSPHATE AND BETAMETHASONE ACETATE 12 MG: 3; 3 INJECTION, SUSPENSION INTRA-ARTICULAR; INTRALESIONAL; INTRAMUSCULAR; SOFT TISSUE at 13:19
